# Patient Record
Sex: FEMALE | Race: OTHER | HISPANIC OR LATINO | URBAN - METROPOLITAN AREA
[De-identification: names, ages, dates, MRNs, and addresses within clinical notes are randomized per-mention and may not be internally consistent; named-entity substitution may affect disease eponyms.]

---

## 2022-02-05 ENCOUNTER — EMERGENCY (EMERGENCY)
Facility: HOSPITAL | Age: 57
LOS: 1 days | Discharge: HOME | End: 2022-02-05
Attending: EMERGENCY MEDICINE | Admitting: EMERGENCY MEDICINE
Payer: COMMERCIAL

## 2022-02-05 VITALS
HEART RATE: 83 BPM | RESPIRATION RATE: 16 BRPM | OXYGEN SATURATION: 100 % | SYSTOLIC BLOOD PRESSURE: 189 MMHG | DIASTOLIC BLOOD PRESSURE: 88 MMHG | TEMPERATURE: 98 F

## 2022-02-05 PROCEDURE — 73100 X-RAY EXAM OF WRIST: CPT | Mod: 26,LT

## 2022-02-05 PROCEDURE — 73030 X-RAY EXAM OF SHOULDER: CPT | Mod: 26,LT

## 2022-02-05 PROCEDURE — 99285 EMERGENCY DEPT VISIT HI MDM: CPT

## 2022-02-05 RX ORDER — LIDOCAINE 4 G/100G
1 CREAM TOPICAL ONCE
Refills: 0 | Status: COMPLETED | OUTPATIENT
Start: 2022-02-05 | End: 2022-02-05

## 2022-02-05 RX ORDER — ACETAMINOPHEN 500 MG
975 TABLET ORAL ONCE
Refills: 0 | Status: COMPLETED | OUTPATIENT
Start: 2022-02-05 | End: 2022-02-05

## 2022-02-05 RX ADMIN — Medication 975 MILLIGRAM(S): at 23:27

## 2022-02-05 NOTE — ED PROVIDER NOTE - NEUROLOGICAL, MLM
Alert and oriented, no focal deficits, no motor or sensory deficits. Alert and oriented, no focal deficits, no motor or sensory deficits. 5/5 strength b/l

## 2022-02-05 NOTE — ED PROVIDER NOTE - EYE, RIGHT
+Lateral subconjunctival hemorrhage, +Scalp hematoma in the parietal region +Lateral subconjunctival hemorrhage

## 2022-02-05 NOTE — ED PROVIDER NOTE - PATIENT PORTAL LINK FT
You can access the FollowMyHealth Patient Portal offered by [facility] by registering at the following website: [email or web address]. By joining [email or web address]’s FollowMyHealth portal, you will also be able to view your health information using other applications (apps) compatible with our system.

## 2022-02-05 NOTE — ED PROVIDER NOTE - NSFOLLOWUPINSTRUCTIONS_ED_ALL_ED_FT
Follow up with your PMD within 24-48 hrs. Show copies of your reports given to you. Take all of your medications as previously prescribed.    If you have any new, worsened or concerning symptoms, please return to the emergency department immediately.         WHAT YOU NEED TO KNOW:    A concussion is a mild brain injury. It is usually caused by a bump or blow to the head from a fall, a motor vehicle crash, or a sports injury. Sometimes being shaken forcefully may cause a concussion.    DISCHARGE INSTRUCTIONS:    Have someone call 911 for any of the following:   •Someone tries to wake you and cannot do so.      •You have a seizure, increasing confusion, or a change in personality.      •Your speech becomes slurred, or you have new vision problems.      Return to the emergency department if:   •You have sudden and new vision problems.      •You have a severe headache that does not go away.      •You have arm or leg weakness, numbness, or new problems with coordination.      •You have blood or clear fluid coming out of the ears or nose.      Contact your healthcare provider if:   •You have nausea or are vomiting.      •You feel more sleepy than usual.      •Your symptoms get worse.      •Your symptoms last longer than 6 weeks after the injury.      •You have questions or concerns about your condition or care.      Medicines: You may need any of the following:   •Acetaminophen decreases pain and fever. It is available without a doctor's order. Ask how much to take and how often to take it. Follow directions. Read the labels of all other medicines you are using to see if they also contain acetaminophen, or ask your doctor or pharmacist. Acetaminophen can cause liver damage if not taken correctly. Do not use more than 4 grams (4,000 milligrams) total of acetaminophen in one day.       •NSAIDs help decrease swelling and pain or fever. This medicine is available with or without a doctor's order. NSAIDs can cause stomach bleeding or kidney problems in certain people. If you take blood thinner medicine, always ask your healthcare provider if NSAIDs are safe for you. Always read the medicine label and follow directions.      •Take your medicine as directed. Contact your healthcare provider if you think your medicine is not helping or if you have side effects. Tell him or her if you are allergic to any medicine. Keep a list of the medicines, vitamins, and herbs you take. Include the amounts, and when and why you take them. Bring the list or the pill bottles to follow-up visits. Carry your medicine list with you in case of an emergency.      Self-care: Concussion symptoms usually go away within about 10 days, but they may last longer. The following may be recommended to manage your symptoms:   •Rest from physical and mental activities as directed. Mental activities are those that require thinking, concentration, and attention. You will need to rest until your symptoms are gone. Rest will allow you to recover from your concussion. Ask your healthcare provider when you can return to work and other daily activities.      •Have someone stay with you for the first 24 hours after your injury. Your healthcare provider should be contacted if your symptoms get worse, or you develop new symptoms.      •Do not participate in sports and physical activities until your healthcare provider says it is okay. They could make your symptoms worse or lead to another concussion. Your healthcare provider will tell you when it is okay for you to return to sports or physical activities. Ask for more information about sports concussions.

## 2022-02-05 NOTE — ED PROVIDER NOTE - CLINICAL SUMMARY MEDICAL DECISION MAKING FREE TEXT BOX
55 y/o F w/ no pertinent PMHx presents to the ED s/p slip & fall on black ice. Will obtain CT head, heck. Will place pt in C-collar. Will provide pain control and give concussion return precautions. 57 y/o F w/ no pertinent PMHx presents to the ED s/p slip & fall on black ice. Will obtain CT head, neck. Will place pt in C-collar. Will provide pain control and give concussion return precautions. Matt att: 55 y/o F w/ no pertinent PMHx presents to the ED s/p slip & fall on black ice. Will obtain CT head, neck. Will place pt in C-collar. Will provide pain control and give concussion return precautions.

## 2022-02-05 NOTE — ED PROVIDER NOTE - MUSCULOSKELETAL MINIMAL EXAM
+Cervical spine tenderness. +Left trapezius tenderness +Cervical spinal tenderness. +Left trapezius tenderness

## 2022-02-05 NOTE — ED ADULT TRIAGE NOTE - CHIEF COMPLAINT QUOTE
pt states she slipped and fell on ice at 7:40 pm with head strike and +LOC,  pt c.o headache. no double vision. no known medical hx

## 2022-02-05 NOTE — ED PROVIDER NOTE - ENMT, MLM
Airway patent, Nasal mucosa clear. Mouth with normal mucosa. Throat has no vesicles, no oropharyngeal exudates and uvula is midline. Airway patent, Nasal mucosa clear. Mouth with normal mucosa. Throat has no vesicles, no oropharyngeal exudates and uvula is midline. +Scalp hematoma in the parietal region.

## 2022-02-05 NOTE — ED PROVIDER NOTE - OBJECTIVE STATEMENT
55 y/o F w/ no pertinent PMHx presents to the ED s/p slip & fall on black ice. Pt states that she slipped back and hit her head, and having LOC as witnessed by a friend for a few seconds. Pt c/o throbbing headache, whiplash neck pain, left shoulder pain, left wrist pain. Pt denies nausea, vomiting, amnesia, weakness, numbness, tingling, vision changes, eye pain, facial pain, dizziness, slurred speech. Pt additionally reports that her right eye is bloodshot. 57 y/o F w/ no pertinent PMHx presents to the ED s/p slip & fall on black ice. Pt states that she slipped back and hit her head, and had LOC witnessed by a friend for a few seconds. Pt c/o throbbing headache, whiplash neck pain, left shoulder pain, and left wrist pain. Pt denies nausea, vomiting, amnesia, weakness, numbness, tingling, vision changes, eye pain, facial pain, dizziness, slurred speech. Pt additionally reports that her right eye is bloodshot.

## 2022-02-05 NOTE — ED PROVIDER NOTE - CHPI ED SYMPTOMS NEG
no nausea, no vomiting, no amnesia, no weakness, no numbness, no tingling, no vision changes, no eye pain, no facial pain, no dizziness, no slurred speech

## 2022-02-05 NOTE — ED PROVIDER NOTE - PROGRESS NOTE DETAILS
MAEGAN Mora: Case endorsed. Pending imaging studies - all reviewed and no acute findings. Pt reassessed, states she feels better. C-collar cleared- full ROM of neck in all directions. Neuro non-focal. Pt is clinically stable for discharge home. PMD follow up. Stitct return precautions.

## 2022-02-06 VITALS
SYSTOLIC BLOOD PRESSURE: 135 MMHG | HEART RATE: 71 BPM | DIASTOLIC BLOOD PRESSURE: 75 MMHG | OXYGEN SATURATION: 100 % | RESPIRATION RATE: 16 BRPM | TEMPERATURE: 98 F

## 2022-02-06 PROCEDURE — 72125 CT NECK SPINE W/O DYE: CPT | Mod: 26,MA

## 2022-02-06 PROCEDURE — 70450 CT HEAD/BRAIN W/O DYE: CPT | Mod: 26,MA

## 2022-02-06 RX ADMIN — LIDOCAINE 1 PATCH: 4 CREAM TOPICAL at 02:12

## 2022-07-10 ENCOUNTER — EMERGENCY (EMERGENCY)
Facility: HOSPITAL | Age: 57
LOS: 1 days | Discharge: HOME | End: 2022-07-10
Attending: EMERGENCY MEDICINE | Admitting: EMERGENCY MEDICINE

## 2022-07-10 VITALS
RESPIRATION RATE: 16 BRPM | DIASTOLIC BLOOD PRESSURE: 79 MMHG | SYSTOLIC BLOOD PRESSURE: 157 MMHG | OXYGEN SATURATION: 98 % | TEMPERATURE: 99 F | HEART RATE: 105 BPM

## 2022-07-10 VITALS
SYSTOLIC BLOOD PRESSURE: 150 MMHG | TEMPERATURE: 100 F | RESPIRATION RATE: 20 BRPM | OXYGEN SATURATION: 100 % | HEART RATE: 130 BPM | DIASTOLIC BLOOD PRESSURE: 72 MMHG

## 2022-07-10 PROBLEM — Z78.9 OTHER SPECIFIED HEALTH STATUS: Chronic | Status: ACTIVE | Noted: 2022-02-06

## 2022-07-10 LAB
ALBUMIN SERPL ELPH-MCNC: 4.1 G/DL — SIGNIFICANT CHANGE UP (ref 3.3–5)
ALP SERPL-CCNC: 89 U/L — SIGNIFICANT CHANGE UP (ref 40–120)
ALT FLD-CCNC: 16 U/L — SIGNIFICANT CHANGE UP (ref 4–33)
ANION GAP SERPL CALC-SCNC: 14 MMOL/L — SIGNIFICANT CHANGE UP (ref 7–14)
APPEARANCE UR: CLEAR — SIGNIFICANT CHANGE UP
AST SERPL-CCNC: 17 U/L — SIGNIFICANT CHANGE UP (ref 4–32)
B PERT DNA SPEC QL NAA+PROBE: SIGNIFICANT CHANGE UP
B PERT+PARAPERT DNA PNL SPEC NAA+PROBE: SIGNIFICANT CHANGE UP
BACTERIA # UR AUTO: NEGATIVE — SIGNIFICANT CHANGE UP
BASE EXCESS BLDV CALC-SCNC: -1.7 MMOL/L — SIGNIFICANT CHANGE UP (ref -2–3)
BASOPHILS # BLD AUTO: 0.05 K/UL — SIGNIFICANT CHANGE UP (ref 0–0.2)
BASOPHILS NFR BLD AUTO: 0.6 % — SIGNIFICANT CHANGE UP (ref 0–2)
BILIRUB SERPL-MCNC: 0.3 MG/DL — SIGNIFICANT CHANGE UP (ref 0.2–1.2)
BILIRUB UR-MCNC: NEGATIVE — SIGNIFICANT CHANGE UP
BLOOD GAS VENOUS COMPREHENSIVE RESULT: SIGNIFICANT CHANGE UP
BLOOD GAS VENOUS COMPREHENSIVE RESULT: SIGNIFICANT CHANGE UP
BORDETELLA PARAPERTUSSIS (RAPRVP): SIGNIFICANT CHANGE UP
BUN SERPL-MCNC: 13 MG/DL — SIGNIFICANT CHANGE UP (ref 7–23)
C PNEUM DNA SPEC QL NAA+PROBE: SIGNIFICANT CHANGE UP
CALCIUM SERPL-MCNC: 9 MG/DL — SIGNIFICANT CHANGE UP (ref 8.4–10.5)
CHLORIDE BLDV-SCNC: 101 MMOL/L — SIGNIFICANT CHANGE UP (ref 96–108)
CHLORIDE SERPL-SCNC: 104 MMOL/L — SIGNIFICANT CHANGE UP (ref 98–107)
CO2 BLDV-SCNC: 26.7 MMOL/L — HIGH (ref 22–26)
CO2 SERPL-SCNC: 22 MMOL/L — SIGNIFICANT CHANGE UP (ref 22–31)
COLOR SPEC: YELLOW — SIGNIFICANT CHANGE UP
CREAT SERPL-MCNC: 0.86 MG/DL — SIGNIFICANT CHANGE UP (ref 0.5–1.3)
DIFF PNL FLD: ABNORMAL
EGFR: 79 ML/MIN/1.73M2 — SIGNIFICANT CHANGE UP
EOSINOPHIL # BLD AUTO: 0.04 K/UL — SIGNIFICANT CHANGE UP (ref 0–0.5)
EOSINOPHIL NFR BLD AUTO: 0.5 % — SIGNIFICANT CHANGE UP (ref 0–6)
EPI CELLS # UR: 3 /HPF — SIGNIFICANT CHANGE UP (ref 0–5)
FLUAV SUBTYP SPEC NAA+PROBE: SIGNIFICANT CHANGE UP
FLUBV RNA SPEC QL NAA+PROBE: SIGNIFICANT CHANGE UP
GAS PNL BLDV: 135 MMOL/L — LOW (ref 136–145)
GLUCOSE BLDV-MCNC: 92 MG/DL — SIGNIFICANT CHANGE UP (ref 70–99)
GLUCOSE SERPL-MCNC: 95 MG/DL — SIGNIFICANT CHANGE UP (ref 70–99)
GLUCOSE UR QL: NEGATIVE — SIGNIFICANT CHANGE UP
HADV DNA SPEC QL NAA+PROBE: SIGNIFICANT CHANGE UP
HCO3 BLDV-SCNC: 25 MMOL/L — SIGNIFICANT CHANGE UP (ref 22–29)
HCOV 229E RNA SPEC QL NAA+PROBE: SIGNIFICANT CHANGE UP
HCOV HKU1 RNA SPEC QL NAA+PROBE: SIGNIFICANT CHANGE UP
HCOV NL63 RNA SPEC QL NAA+PROBE: SIGNIFICANT CHANGE UP
HCOV OC43 RNA SPEC QL NAA+PROBE: SIGNIFICANT CHANGE UP
HCT VFR BLD CALC: 40.5 % — SIGNIFICANT CHANGE UP (ref 34.5–45)
HCT VFR BLDA CALC: 42 % — SIGNIFICANT CHANGE UP (ref 34.5–46.5)
HGB BLD CALC-MCNC: 14 G/DL — SIGNIFICANT CHANGE UP (ref 11.5–15.5)
HGB BLD-MCNC: 13.7 G/DL — SIGNIFICANT CHANGE UP (ref 11.5–15.5)
HMPV RNA SPEC QL NAA+PROBE: SIGNIFICANT CHANGE UP
HPIV1 RNA SPEC QL NAA+PROBE: SIGNIFICANT CHANGE UP
HPIV2 RNA SPEC QL NAA+PROBE: SIGNIFICANT CHANGE UP
HPIV3 RNA SPEC QL NAA+PROBE: SIGNIFICANT CHANGE UP
HPIV4 RNA SPEC QL NAA+PROBE: SIGNIFICANT CHANGE UP
HYALINE CASTS # UR AUTO: 2 /LPF — SIGNIFICANT CHANGE UP (ref 0–7)
IANC: 6.29 K/UL — SIGNIFICANT CHANGE UP (ref 1.8–7.4)
IMM GRANULOCYTES NFR BLD AUTO: 0.4 % — SIGNIFICANT CHANGE UP (ref 0–1.5)
KETONES UR-MCNC: ABNORMAL
LACTATE BLDV-MCNC: 2.1 MMOL/L — HIGH (ref 0.5–2)
LEUKOCYTE ESTERASE UR-ACNC: NEGATIVE — SIGNIFICANT CHANGE UP
LIDOCAIN IGE QN: 19 U/L — SIGNIFICANT CHANGE UP (ref 7–60)
LYMPHOCYTES # BLD AUTO: 1.15 K/UL — SIGNIFICANT CHANGE UP (ref 1–3.3)
LYMPHOCYTES # BLD AUTO: 14.3 % — SIGNIFICANT CHANGE UP (ref 13–44)
M PNEUMO DNA SPEC QL NAA+PROBE: SIGNIFICANT CHANGE UP
MAGNESIUM SERPL-MCNC: 2.2 MG/DL — SIGNIFICANT CHANGE UP (ref 1.6–2.6)
MCHC RBC-ENTMCNC: 30.5 PG — SIGNIFICANT CHANGE UP (ref 27–34)
MCHC RBC-ENTMCNC: 33.8 GM/DL — SIGNIFICANT CHANGE UP (ref 32–36)
MCV RBC AUTO: 90.2 FL — SIGNIFICANT CHANGE UP (ref 80–100)
MONOCYTES # BLD AUTO: 0.49 K/UL — SIGNIFICANT CHANGE UP (ref 0–0.9)
MONOCYTES NFR BLD AUTO: 6.1 % — SIGNIFICANT CHANGE UP (ref 2–14)
NEUTROPHILS # BLD AUTO: 6.29 K/UL — SIGNIFICANT CHANGE UP (ref 1.8–7.4)
NEUTROPHILS NFR BLD AUTO: 78.1 % — HIGH (ref 43–77)
NITRITE UR-MCNC: NEGATIVE — SIGNIFICANT CHANGE UP
NRBC # BLD: 0 /100 WBCS — SIGNIFICANT CHANGE UP
NRBC # FLD: 0 K/UL — SIGNIFICANT CHANGE UP
PCO2 BLDV: 50 MMHG — HIGH (ref 39–42)
PH BLDV: 7.31 — LOW (ref 7.32–7.43)
PH UR: 6.5 — SIGNIFICANT CHANGE UP (ref 5–8)
PHOSPHATE SERPL-MCNC: 4.2 MG/DL — SIGNIFICANT CHANGE UP (ref 2.5–4.5)
PLATELET # BLD AUTO: 222 K/UL — SIGNIFICANT CHANGE UP (ref 150–400)
PO2 BLDV: 33 MMHG — SIGNIFICANT CHANGE UP
POTASSIUM BLDV-SCNC: 4.1 MMOL/L — SIGNIFICANT CHANGE UP (ref 3.5–5.1)
POTASSIUM SERPL-MCNC: 4.5 MMOL/L — SIGNIFICANT CHANGE UP (ref 3.5–5.3)
POTASSIUM SERPL-SCNC: 4.5 MMOL/L — SIGNIFICANT CHANGE UP (ref 3.5–5.3)
PROT SERPL-MCNC: 7.5 G/DL — SIGNIFICANT CHANGE UP (ref 6–8.3)
PROT UR-MCNC: ABNORMAL
RAPID RVP RESULT: DETECTED
RBC # BLD: 4.49 M/UL — SIGNIFICANT CHANGE UP (ref 3.8–5.2)
RBC # FLD: 13 % — SIGNIFICANT CHANGE UP (ref 10.3–14.5)
RBC CASTS # UR COMP ASSIST: 2 /HPF — SIGNIFICANT CHANGE UP (ref 0–4)
RSV RNA SPEC QL NAA+PROBE: DETECTED
RV+EV RNA SPEC QL NAA+PROBE: SIGNIFICANT CHANGE UP
SAO2 % BLDV: 51.1 % — SIGNIFICANT CHANGE UP
SARS-COV-2 RNA SPEC QL NAA+PROBE: SIGNIFICANT CHANGE UP
SODIUM SERPL-SCNC: 140 MMOL/L — SIGNIFICANT CHANGE UP (ref 135–145)
SP GR SPEC: 1.03 — SIGNIFICANT CHANGE UP (ref 1–1.05)
UROBILINOGEN FLD QL: SIGNIFICANT CHANGE UP
WBC # BLD: 8.05 K/UL — SIGNIFICANT CHANGE UP (ref 3.8–10.5)
WBC # FLD AUTO: 8.05 K/UL — SIGNIFICANT CHANGE UP (ref 3.8–10.5)
WBC UR QL: 1 /HPF — SIGNIFICANT CHANGE UP (ref 0–5)

## 2022-07-10 PROCEDURE — 93010 ELECTROCARDIOGRAM REPORT: CPT

## 2022-07-10 PROCEDURE — 71045 X-RAY EXAM CHEST 1 VIEW: CPT | Mod: 26

## 2022-07-10 PROCEDURE — 99285 EMERGENCY DEPT VISIT HI MDM: CPT | Mod: 25

## 2022-07-10 RX ORDER — ACETAMINOPHEN 500 MG
650 TABLET ORAL ONCE
Refills: 0 | Status: COMPLETED | OUTPATIENT
Start: 2022-07-10 | End: 2022-07-10

## 2022-07-10 RX ORDER — SUCRALFATE 1 G
1 TABLET ORAL ONCE
Refills: 0 | Status: COMPLETED | OUTPATIENT
Start: 2022-07-10 | End: 2022-07-10

## 2022-07-10 RX ORDER — SODIUM CHLORIDE 9 MG/ML
1000 INJECTION, SOLUTION INTRAVENOUS ONCE
Refills: 0 | Status: COMPLETED | OUTPATIENT
Start: 2022-07-10 | End: 2022-07-10

## 2022-07-10 RX ORDER — IPRATROPIUM/ALBUTEROL SULFATE 18-103MCG
3 AEROSOL WITH ADAPTER (GRAM) INHALATION
Refills: 0 | Status: COMPLETED | OUTPATIENT
Start: 2022-07-10 | End: 2022-07-10

## 2022-07-10 RX ORDER — SODIUM CHLORIDE 9 MG/ML
1000 INJECTION INTRAMUSCULAR; INTRAVENOUS; SUBCUTANEOUS ONCE
Refills: 0 | Status: COMPLETED | OUTPATIENT
Start: 2022-07-10 | End: 2022-07-10

## 2022-07-10 RX ADMIN — SODIUM CHLORIDE 1000 MILLILITER(S): 9 INJECTION INTRAMUSCULAR; INTRAVENOUS; SUBCUTANEOUS at 16:04

## 2022-07-10 RX ADMIN — SODIUM CHLORIDE 1000 MILLILITER(S): 9 INJECTION, SOLUTION INTRAVENOUS at 19:03

## 2022-07-10 RX ADMIN — Medication 3 MILLILITER(S): at 16:03

## 2022-07-10 RX ADMIN — Medication 3 MILLILITER(S): at 16:02

## 2022-07-10 RX ADMIN — Medication 650 MILLIGRAM(S): at 15:59

## 2022-07-10 RX ADMIN — SODIUM CHLORIDE 1000 MILLILITER(S): 9 INJECTION INTRAMUSCULAR; INTRAVENOUS; SUBCUTANEOUS at 16:46

## 2022-07-10 RX ADMIN — Medication 3 MILLILITER(S): at 17:00

## 2022-07-10 NOTE — ED PROVIDER NOTE - PATIENT PORTAL LINK FT
You can access the FollowMyHealth Patient Portal offered by [facility] by registering at the following website: [email or web address]. By joining [employer]’s FollowMyHealth portal, you will also be able to view your health information using other applications (apps) compatible with our system.

## 2022-07-10 NOTE — ED PROVIDER NOTE - PHYSICAL EXAMINATION
GEN - NAD; well appearing; A+O x3; non-toxic appearing  MOUTH - Dry mucous membranes  NECK - Supple  CARD -s1s2, RRR, no M,G,R;   PULM - CTA b/l, symmetric breath sounds;   ABD -  +BS, ND, NT, soft, no guarding, no rebound, no masses;   BACK - no CVA tenderness, Normal  spine;   EXT - symmetric pulses, 2+ dp, capillary refill < 2 seconds, no cyanosis, no edema;   NEURO - no focal neuro deficits, no slurred speech

## 2022-07-10 NOTE — ED ADULT NURSE NOTE - OBJECTIVE STATEMENT
Pt received to intake 4  , awake and alert, A&OX4, ambulatory. C/o non productive cough, body aches, abd discomfort since Wednesday. Respirations even and unlabored. Subjective fevers at home. Denies CP, SOB, N/V, HA, dizziness, palpitations, fatigue. 20G IV placed to   RAC  . Bed in lowest position, call bell within reach. Will continue to monitor.

## 2022-07-10 NOTE — ED PROVIDER NOTE - CLINICAL SUMMARY MEDICAL DECISION MAKING FREE TEXT BOX
Likely viral illness, with fever, chills and nausea and vomiting, will obtain cbc, cmp, lactate, symptomatic tx with nebs, tylenol and reassess.  Neck supple low concern for meningitis vs encephelitis at this time.  Will reassess. Likely viral illness, with fever, chills and nausea and vomiting, will obtain cbc, cmp, lactate, symptomatic tx with nebs, tylenol and reassess.  Neck supple low concern for meningitis vs encephalitis at this time.  Will reassess.

## 2022-07-10 NOTE — ED PROVIDER NOTE - CARE PLAN
Principal Discharge DX:	Respiratory syncytial virus (RSV) infection  Secondary Diagnosis:	Cough with fever   1

## 2022-07-10 NOTE — ED PROVIDER NOTE - PROGRESS NOTE DETAILS
Pt states she feels a little better but is still c/o gastric irritation. Repeat HR still 107.  Will order another fluid bolus and  will trial Maalox and see if pt can tolerate PO.  Lab results and plan d/w pt who agreed to plan.  All questions answered.  Renetta Nash NP

## 2022-07-10 NOTE — ED PROVIDER NOTE - NS ED ROS FT
REVIEW OF SYSTEMS:   CONSTITUTIONAL: Noweight loss,   EYES: No eye pain, visual disturbances, or discharge  ENMT:  No difficulty hearing, tinnitus, vertigo; No sinus or throat pain  NECK: No pain or stiffness  RESPIRATORY: + cough, No hemoptysis; No shortness of breath  CARDIOVASCULAR: No  palpitations,  or leg swelling  GASTROINTESTINAL:+  epigastric pain and nausea, and vomiting, no hematemesis; No diarrhea or constipation. No melena or hematochezia.  GENITOURINARY: No dysuria, frequency, hematuria, or incontinence  NEUROLOGICAL: No headaches, memory loss, loss of strength, numbness, or tremors  SKIN: No itching, burning, rashes, or lesions

## 2022-07-10 NOTE — ED ADULT NURSE REASSESSMENT NOTE - NS ED NURSE REASSESS COMMENT FT1
received report from day shift rn. patient awake. aaox4. laying comfortably in bed. denies pain or discomfort. updated on plan of care. side rails up. fluids flowing to right ac #20g. site intact. Will continue to monitor

## 2022-07-10 NOTE — ED PROVIDER NOTE - OBJECTIVE STATEMENT
55 yo F with no PMH a/w fever, cough, abdominal pain and generalized weakness.  Pt reports cough for 3-4 days with high fever to 103-104 x 2 days.  Pt reports associated nausea and vomiting with mid-epigastric burning.  Reports trying to water at home, however patient noted to be unable to tolerate PO.  Reports chest tightness with cough, no exertional symptoms.     No fever/chills, No photophobia/eye pain/changes in vision, No ear pain/sore throat/dysphagia, no dysuria/frequency/discharge, No neck/back pain, no rash, no changes in neurological status/function.

## 2022-07-10 NOTE — ED PROVIDER NOTE - NSFOLLOWUPINSTRUCTIONS_ED_ALL_ED_FT
Upper Respiratory Infection, Adult  An upper respiratory infection (URI) is a common viral infection of the nose, throat, and upper air passages that lead to the lungs. The most common type of URI is the common cold. URIs usually get better on their own, without medical treatment.    What are the causes?  A URI is caused by a virus. You may catch a virus by:    Breathing in droplets from an infected person's cough or sneeze.  Touching something that has been exposed to the virus (contaminated) and then touching your mouth, nose, or eyes.    What increases the risk?  You are more likely to get a URI if:    You are very young or very old.  It is shubham or winter.  You have close contact with others, such as at a , school, or health care facility.  You smoke.  You have long-term (chronic) heart or lung disease.  You have a weakened disease-fighting (immune) system.  You have nasal allergies or asthma.  You are experiencing a lot of stress.  You work in an area that has poor air circulation.  You have poor nutrition.    What are the signs or symptoms?  A URI usually involves some of the following symptoms:    Runny or stuffy (congested) nose.  Sneezing.  Cough.  Sore throat.  Headache.  Fatigue.  Fever.  Loss of appetite.  Pain in your forehead, behind your eyes, and over your cheekbones (sinus pain).  Muscle aches.  Redness or irritation of the eyes.  Pressure in the ears or face.    How is this diagnosed?  This condition may be diagnosed based on your medical history and symptoms, and a physical exam. Your health care provider may use a cotton swab to take a mucus sample from your nose (nasal swab). This sample can be tested to determine what virus is causing the illness.    How is this treated?  URIs usually get better on their own within 7–10 days. You can take steps at home to relieve your symptoms. Medicines cannot cure URIs, but your health care provider may recommend certain medicines to help relieve symptoms, such as:    Over-the-counter cold medicines.  Cough suppressants. Coughing is a type of defense against infection that helps to clear the respiratory system, so take these medicines only as recommended by your health care provider.  Fever-reducing medicines.    Follow these instructions at home:  Activity     Rest as needed.  If you have a fever, stay home from work or school until your fever is gone or until your health care provider says you are no longer contagious. Your health care provider may have you wear a face mask to prevent your infection from spreading.  Relieving symptoms     Gargle with a salt-water mixture 3–4 times a day or as needed. To make a salt-water mixture, completely dissolve ½–1 tsp of salt in 1 cup of warm water.  Use a cool-mist humidifier to add moisture to the air. This can help you breathe more easily.  Eating and drinking     Drink enough fluid to keep your urine pale yellow.  ImageEat soups and other clear broths.  General instructions     Take over-the-counter and prescription medicines only as told by your health care provider. These include cold medicines, fever reducers, and cough suppressants.  Do not use any products that contain nicotine or tobacco, such as cigarettes and e-cigarettes. If you need help quitting, ask your health care provider.   Stay away from secondhand smoke.  Stay up to date on all immunizations, including the yearly (annual) flu vaccine.  ImageKeep all follow-up visits as told by your health care provider. This is important.  How to prevent the spread of infection to others     ImageURIs can be passed from person to person (are contagious). To prevent the infection from spreading:    Wash your hands often with soap and water. If soap and water are not available, use hand .  Avoid touching your mouth, face, eyes, or nose.  Cough or sneeze into a tissue or your sleeve or elbow instead of into your hand or into the air.    Contact a health care provider if:  You are getting worse instead of better.  You have a fever or chills.  Your mucus is brown or red.  You have yellow or brown discharge coming from your nose.  You have pain in your face, especially when you bend forward.  You have swollen neck glands.  You have pain while swallowing.  You have white areas in the back of your throat.  Get help right away if:  You have shortness of breath that gets worse.  You have severe or persistent:    Headache.  Ear pain.  Sinus pain.  Chest pain.    You have chronic lung disease along with any of the following:    Wheezing.  Prolonged cough.  Coughing up blood.  A change in your usual mucus.    You have a stiff neck.  You have changes in your:    Vision.  Hearing.  Thinking.  Mood.    Summary  An upper respiratory infection (URI) is a common infection of the nose, throat, and upper air passages that lead to the lungs.  A URI is caused by a virus.  URIs usually get better on their own within 7–10 days.  Medicines cannot cure URIs, but your health care provider may recommend certain medicines to help relieve symptoms.

## 2022-07-11 LAB
CULTURE RESULTS: SIGNIFICANT CHANGE UP
SPECIMEN SOURCE: SIGNIFICANT CHANGE UP

## 2025-04-22 NOTE — ED PROVIDER NOTE - GASTROINTESTINAL, MLM
HPI    DLE: 7/12/2024 - Dr. Mariano  Urgent     Pt states that she woke up this morning with her right eye burning,   dryness, tearing, and slight redness. Pt states that she started using   refresh without much improvement of symptoms. Pt states that she stopped   using xiidra and pm dionicio because her eyes were feeling better. Pt didn't   realize that she should be using drops indefinitely.     GTTS:  Xiidra BID - restarted this morning   Refresh - PRN     Last edited by Concetta Stern MA on 4/22/2025 11:25 AM.            Assessment /Plan     For exam results, see Encounter Report.    Corneal epithelial defect    Conjunctival hyperemia of right eye      Use sytane ointment QHS x 1 week  Restart Xiidra, consider VEVYE in the future  Start PF art tears Q1hour until resolved      RTC July for annual                   
Abdomen soft, non-tender, no guarding.